# Patient Record
(demographics unavailable — no encounter records)

---

## 2025-01-31 NOTE — PHYSICAL EXAM
[Normal Appearance] : normal appearance [Well Groomed] : well groomed [General Appearance - In No Acute Distress] : no acute distress [Respiration, Rhythm And Depth] : normal respiratory rhythm and effort [Exaggerated Use Of Accessory Muscles For Inspiration] : no accessory muscle use [Urethral Meatus] : meatus normal [Penis Abnormality] : normal circumcised penis [Scrotum] : the scrotum was normal [Testes Tenderness] : no tenderness of the testes [Testes Mass (___cm)] : there were no testicular masses [Prostate Tenderness] : the prostate was not tender [No Prostate Nodules] : no prostate nodules [Prostate Size ___ gm] : prostate size [unfilled] gm [Normal Station and Gait] : the gait and station were normal for the patient's age [] : no rash [No Focal Deficits] : no focal deficits [Affect] : the affect was normal [Oriented To Time, Place, And Person] : oriented to person, place, and time [Mood] : the mood was normal [de-identified] : B/L 20cc testes, no masses. Prostate soft

## 2025-01-31 NOTE — END OF VISIT
[FreeTextEntry3] :  I, Dr. Mauro, personally performed the evaluation and management (E/M) services for this new patient.  That E/M includes conducting the clinically appropriate initial history &/or exam, assessing all conditions, and establishing the plan of care.  Today, my BALWINDER, XMOS Bret, was here to observe my evaluation and management service for this patient & follow plan of care established by me going forward.

## 2025-01-31 NOTE — END OF VISIT
[FreeTextEntry3] :  I, Dr. Mauro, personally performed the evaluation and management (E/M) services for this new patient.  That E/M includes conducting the clinically appropriate initial history &/or exam, assessing all conditions, and establishing the plan of care.  Today, my BALWINDER, [employer] Bret, was here to observe my evaluation and management service for this patient & follow plan of care established by me going forward.

## 2025-01-31 NOTE — HISTORY OF PRESENT ILLNESS
[FreeTextEntry1] : SHANT YEAGER is a 84 year M presenting on 01/30/2025 PMH: BPH/OAB, elevated PSA, HLD, HTN, Afib,  meds: Xarelto Referred by: Akin chino  Sent by PCP for elevated PSA Previously seeing Oliverio, Insurance change.  BPH/OAB: H/O procedure? with Dr Virgen 2018 On alfuzosin for few years and gemtesa for past 2 months,  previously on myrbetriq (ran out of samples) and tamsulosin (diarrhea) Baseline frequency, especially in cold.  Nocturia improved from 3-4x. Now 1x. Comfortable with symptoms No hematuria PVR to r/o retention: 6 mL  No ED concerns  mother breast cancer. Not sure father's history No tobacco history  Never done prostate MRI  PSA: 7.1, 1/14/2025, free 24.1% 4.75, 6/2024   Creat 1.31, 1/2025 
[FreeTextEntry1] : SHANT YEAGER is a 84 year M presenting on 01/30/2025 PMH: BPH/OAB, elevated PSA, HLD, HTN, Afib,  meds: Xarelto Referred by: Akin chino  Sent by PCP for elevated PSA Previously seeing Oliverio, Insurance change.  BPH/OAB: H/O procedure? with Dr Virgen 2018 On alfuzosin for few years and gemtesa for past 2 months,  previously on myrbetriq (ran out of samples) and tamsulosin (diarrhea) Baseline frequency, especially in cold.  Nocturia improved from 3-4x. Now 1x. Comfortable with symptoms No hematuria PVR to r/o retention: 6 mL  No ED concerns  mother breast cancer. Not sure father's history No tobacco history  Never done prostate MRI  PSA: 7.1, 1/14/2025, free 24.1% 4.75, 6/2024   Creat 1.31, 1/2025 
complains of pain/discomfort

## 2025-01-31 NOTE — ASSESSMENT
[FreeTextEntry1] : 83yo M elevated PSA, BPH/OAB -UA, Ucx -PSA in 2-3 mo -continue alfuzosin and gemtesa -renal sono today for elevated creatinine - no hydro. recommend nephrology eval if rising trend for MRI prostate -F/U 3 mo